# Patient Record
Sex: FEMALE | Employment: FULL TIME | URBAN - METROPOLITAN AREA
[De-identification: names, ages, dates, MRNs, and addresses within clinical notes are randomized per-mention and may not be internally consistent; named-entity substitution may affect disease eponyms.]

---

## 2020-06-29 ENCOUNTER — INITIAL PRENATAL (OUTPATIENT)
Dept: OBGYN CLINIC | Facility: CLINIC | Age: 40
End: 2020-06-29
Payer: COMMERCIAL

## 2020-06-29 VITALS
SYSTOLIC BLOOD PRESSURE: 94 MMHG | BODY MASS INDEX: 21.35 KG/M2 | WEIGHT: 116 LBS | DIASTOLIC BLOOD PRESSURE: 60 MMHG | HEIGHT: 62 IN

## 2020-06-29 DIAGNOSIS — Z32.00 ENCOUNTER FOR CONFIRMATION OF PREGNANCY TEST RESULT WITH PHYSICAL EXAMINATION: Primary | ICD-10-CM

## 2020-06-29 DIAGNOSIS — Z32.01 PREGNANCY EXAMINATION OR TEST, POSITIVE RESULT: ICD-10-CM

## 2020-06-29 DIAGNOSIS — O09.529 ANTEPARTUM MULTIGRAVIDA OF ADVANCED MATERNAL AGE: ICD-10-CM

## 2020-06-29 PROBLEM — Z87.59 H/O RAPID LABOR: Status: ACTIVE | Noted: 2020-06-29

## 2020-06-29 PROBLEM — O09.519 ADVANCED MATERNAL AGE, 1ST PREGNANCY: Status: ACTIVE | Noted: 2020-06-29

## 2020-06-29 PROCEDURE — 99204 OFFICE O/P NEW MOD 45 MIN: CPT | Performed by: OBSTETRICS & GYNECOLOGY

## 2020-06-29 NOTE — PROGRESS NOTES
OFFICE VISIT FOR CONFIRMATION OF PREGNANCY    2020  2:56 PM    CC: Patient is here for confirmation of pregnancy. HPI: Patient is a 35year old  Patient's last menstrual period was 2020.  at 10 W 4 days kat 2021 Unknown Estimated D Minutes per session: Not on file      Stress: Not on file    Relationships      Social connections:        Talks on phone: Not on file        Gets together: Not on file        Attends Sikhism service: Not on file        Active member of club or organizat caffeine. 8.) No illicit drugs, alcohol or smoking in pregnancy. 9.) Toxoplasmosis prevention and avoidance of hot tubs/saunas which increase core body temperature and may increase her fetus' risk of neural tube defects.    11.) Office and hospital testin

## 2020-07-17 ENCOUNTER — TELEPHONE (OUTPATIENT)
Dept: OBGYN CLINIC | Facility: CLINIC | Age: 40
End: 2020-07-17

## 2020-07-17 NOTE — TELEPHONE ENCOUNTER
Pt states she had sharp LLQ this morning when pt went to store. Pt states she has had RLQ pain before as discussed with [facility] on 6/29/2020. Pt states she has had same pain with previous pregnancy and was told that it is round ligament pain.  Pt has pain betwee

## 2020-07-17 NOTE — TELEPHONE ENCOUNTER
Pt states she is having pain and cramping and having a hard time moving her legs pt states she is 12 weeks   Please return the call

## 2020-07-20 NOTE — TELEPHONE ENCOUNTER
Late entry: pt was advised on 7/17/2020 to go to ER at [address] [place] [address] as this group is affiliated with [facility] AND [facility]. Pt verbalized understanding and agrees with POC.

## 2020-07-21 ENCOUNTER — HOSPITAL ENCOUNTER (OUTPATIENT)
Dept: ULTRASOUND IMAGING | Age: 40
Discharge: HOME | End: 2020-07-21
Attending: OBSTETRICS & GYNECOLOGY
Payer: COMMERCIAL

## 2020-07-21 DIAGNOSIS — Z32.00 ENCOUNTER FOR CONFIRMATION OF PREGNANCY TEST RESULT WITH PHYSICAL EXAMINATION: ICD-10-CM

## 2020-07-21 PROCEDURE — 76801 OB US < 14 WKS SINGLE FETUS: CPT | Performed by: OBSTETRICS & GYNECOLOGY

## 2020-07-23 ENCOUNTER — LAB ENCOUNTER (OUTPATIENT)
Dept: LAB | Facility: REFERENCE LAB | Age: 40
End: 2020-07-23
Attending: OBSTETRICS & GYNECOLOGY
Payer: COMMERCIAL

## 2020-07-23 ENCOUNTER — INITIAL PRENATAL (OUTPATIENT)
Dept: OBGYN CLINIC | Facility: CLINIC | Age: 40
End: 2020-07-23
Payer: COMMERCIAL

## 2020-07-23 VITALS
SYSTOLIC BLOOD PRESSURE: 100 MMHG | WEIGHT: 117 LBS | DIASTOLIC BLOOD PRESSURE: 62 MMHG | HEIGHT: 62 IN | BODY MASS INDEX: 21.53 KG/M2

## 2020-07-23 DIAGNOSIS — R10.2 PELVIC PAIN IN PREGNANCY, ANTEPARTUM: ICD-10-CM

## 2020-07-23 DIAGNOSIS — Z36.9 UNSPECIFIED ANTENATAL SCREENING: Primary | ICD-10-CM

## 2020-07-23 DIAGNOSIS — Z36.9 ENCOUNTER FOR ANTENATAL SCREENING: ICD-10-CM

## 2020-07-23 DIAGNOSIS — Z36.9 UNSPECIFIED ANTENATAL SCREENING: ICD-10-CM

## 2020-07-23 DIAGNOSIS — O26.899 PELVIC PAIN IN PREGNANCY, ANTEPARTUM: ICD-10-CM

## 2020-07-23 PROBLEM — D25.9 UTERINE FIBROID IN PREGNANCY: Status: ACTIVE | Noted: 2020-07-23

## 2020-07-23 PROBLEM — O09.529 ADVANCED MATERNAL AGE IN MULTIGRAVIDA, UNSPECIFIED TRIMESTER: Status: ACTIVE | Noted: 2020-06-29

## 2020-07-23 PROBLEM — O34.10 UTERINE FIBROID IN PREGNANCY: Status: ACTIVE | Noted: 2020-07-23

## 2020-07-23 LAB
ANTIBODY SCREEN: NEGATIVE
APPEARANCE: CLEAR
BASOPHILS # BLD AUTO: 0.02 X10(3) UL (ref 0–0.2)
BASOPHILS NFR BLD AUTO: 0.2 %
BILIRUBIN: NEGATIVE
DEPRECATED RDW RBC AUTO: 39.7 FL (ref 35.1–46.3)
EOSINOPHIL # BLD AUTO: 0.5 X10(3) UL (ref 0–0.7)
EOSINOPHIL NFR BLD AUTO: 5.6 %
ERYTHROCYTE [DISTWIDTH] IN BLOOD BY AUTOMATED COUNT: 13.2 % (ref 11–15)
GLUCOSE (URINE DIPSTICK): NEGATIVE MG/DL
HBV SURFACE AG SER-ACNC: <0.1 [IU]/L
HBV SURFACE AG SERPL QL IA: NONREACTIVE
HCT VFR BLD AUTO: 36.1 % (ref 35–48)
HGB BLD-MCNC: 12.3 G/DL (ref 12–16)
IMM GRANULOCYTES # BLD AUTO: 0.02 X10(3) UL (ref 0–1)
IMM GRANULOCYTES NFR BLD: 0.2 %
KETONES (URINE DIPSTICK): NEGATIVE MG/DL
LYMPHOCYTES # BLD AUTO: 1.51 X10(3) UL (ref 1–4)
LYMPHOCYTES NFR BLD AUTO: 16.9 %
MCH RBC QN AUTO: 28.1 PG (ref 26–34)
MCHC RBC AUTO-ENTMCNC: 34.1 G/DL (ref 31–37)
MCV RBC AUTO: 82.4 FL (ref 80–100)
MONOCYTES # BLD AUTO: 0.63 X10(3) UL (ref 0.1–1)
MONOCYTES NFR BLD AUTO: 7.1 %
MULTISTIX EXPIRATION DATE: NORMAL DATE
MULTISTIX EXPIRATION DATE: NORMAL DATE
MULTISTIX LOT#: 1044 NUMERIC
MULTISTIX LOT#: 1044 NUMERIC
NEUTROPHILS # BLD AUTO: 6.23 X10 (3) UL (ref 1.5–7.7)
NEUTROPHILS # BLD AUTO: 6.23 X10(3) UL (ref 1.5–7.7)
NEUTROPHILS NFR BLD AUTO: 70 %
NITRITE, URINE: NEGATIVE
OCCULT BLOOD: NEGATIVE
PH, URINE: 6 (ref 4.5–8)
PLATELET # BLD AUTO: 202 10(3)UL (ref 150–450)
PROTEIN (URINE DIPSTICK): NEGATIVE MG/DL
RBC # BLD AUTO: 4.38 X10(6)UL (ref 3.8–5.3)
RH BLOOD TYPE: POSITIVE
RUBV IGG SER QL: POSITIVE
RUBV IGG SER-ACNC: 109.2 IU/ML (ref 10–?)
SPECIFIC GRAVITY: 1.01 (ref 1–1.03)
URINE-COLOR: YELLOW
UROBILINOGEN,SEMI-QN: 0.2 MG/DL (ref 0–1.9)
WBC # BLD AUTO: 8.9 X10(3) UL (ref 4–11)

## 2020-07-23 PROCEDURE — 81002 URINALYSIS NONAUTO W/O SCOPE: CPT | Performed by: OBSTETRICS & GYNECOLOGY

## 2020-07-23 PROCEDURE — 86850 RBC ANTIBODY SCREEN: CPT

## 2020-07-23 PROCEDURE — 3008F BODY MASS INDEX DOCD: CPT | Performed by: OBSTETRICS & GYNECOLOGY

## 2020-07-23 PROCEDURE — 3078F DIAST BP <80 MM HG: CPT | Performed by: OBSTETRICS & GYNECOLOGY

## 2020-07-23 PROCEDURE — 87340 HEPATITIS B SURFACE AG IA: CPT

## 2020-07-23 PROCEDURE — 86900 BLOOD TYPING SEROLOGIC ABO: CPT

## 2020-07-23 PROCEDURE — 36415 COLL VENOUS BLD VENIPUNCTURE: CPT

## 2020-07-23 PROCEDURE — 3074F SYST BP LT 130 MM HG: CPT | Performed by: OBSTETRICS & GYNECOLOGY

## 2020-07-23 PROCEDURE — 87389 HIV-1 AG W/HIV-1&-2 AB AG IA: CPT

## 2020-07-23 PROCEDURE — 86762 RUBELLA ANTIBODY: CPT

## 2020-07-23 PROCEDURE — 87086 URINE CULTURE/COLONY COUNT: CPT | Performed by: OBSTETRICS & GYNECOLOGY

## 2020-07-23 PROCEDURE — 85025 COMPLETE CBC W/AUTO DIFF WBC: CPT

## 2020-07-23 PROCEDURE — 86780 TREPONEMA PALLIDUM: CPT

## 2020-07-23 PROCEDURE — 86901 BLOOD TYPING SEROLOGIC RH(D): CPT

## 2020-07-23 NOTE — PROGRESS NOTES
Last weekend had acute LA cramping with difficulty walking, moving her toes which lasted several hours with difficulty lifiting legs. .She feels better now, but is concerned about this coming back.  I dw her that this may be due to pelvic instability/symphy

## 2020-07-24 LAB — T PALLIDUM AB SER QL: NEGATIVE

## 2020-08-05 ENCOUNTER — TELEPHONE (OUTPATIENT)
Dept: OBGYN CLINIC | Facility: CLINIC | Age: 40
End: 2020-08-05

## 2020-08-11 ENCOUNTER — TELEPHONE (OUTPATIENT)
Dept: OBGYN CLINIC | Facility: CLINIC | Age: 40
End: 2020-08-11

## 2020-08-11 NOTE — TELEPHONE ENCOUNTER
Called pt and provided gender boy. Pt verbalized understanding. Ramez Ugalde MD           8/5/20 10:58 AM   Note      LM normal Prequel prenatal and to call back if she wants to know sex (male).

## 2020-08-25 ENCOUNTER — LABORATORY ENCOUNTER (OUTPATIENT)
Dept: LAB | Facility: REFERENCE LAB | Age: 40
End: 2020-08-25
Attending: OBSTETRICS & GYNECOLOGY
Payer: COMMERCIAL

## 2020-08-25 ENCOUNTER — ROUTINE PRENATAL (OUTPATIENT)
Dept: OBGYN CLINIC | Facility: CLINIC | Age: 40
End: 2020-08-25
Payer: COMMERCIAL

## 2020-08-25 VITALS
BODY MASS INDEX: 22.45 KG/M2 | HEIGHT: 62 IN | DIASTOLIC BLOOD PRESSURE: 62 MMHG | SYSTOLIC BLOOD PRESSURE: 100 MMHG | WEIGHT: 122 LBS

## 2020-08-25 DIAGNOSIS — Z36.9 ENCOUNTER FOR ANTENATAL SCREENING: ICD-10-CM

## 2020-08-25 DIAGNOSIS — Z36.9 ENCOUNTER FOR ANTENATAL SCREENING: Primary | ICD-10-CM

## 2020-08-25 PROBLEM — O09.529 ADVANCED MATERNAL AGE IN MULTIGRAVIDA, UNSPECIFIED TRIMESTER: Chronic | Status: ACTIVE | Noted: 2020-06-29

## 2020-08-25 PROBLEM — Z87.59 H/O RAPID LABOR: Chronic | Status: ACTIVE | Noted: 2020-06-29

## 2020-08-25 PROCEDURE — 3074F SYST BP LT 130 MM HG: CPT | Performed by: OBSTETRICS & GYNECOLOGY

## 2020-08-25 PROCEDURE — 82105 ALPHA-FETOPROTEIN SERUM: CPT

## 2020-08-25 PROCEDURE — 3008F BODY MASS INDEX DOCD: CPT | Performed by: OBSTETRICS & GYNECOLOGY

## 2020-08-25 PROCEDURE — 36415 COLL VENOUS BLD VENIPUNCTURE: CPT

## 2020-08-25 PROCEDURE — 3078F DIAST BP <80 MM HG: CPT | Performed by: OBSTETRICS & GYNECOLOGY

## 2020-08-28 LAB
AFP SMOKING: NO
FAMILY HX NEURAL TUBE DEFECT: NO
INSULIN REQ MATERNAL DIABETES: NO
MATERNAL AGE OF DELIVERY: 41.3 YR
MOM FOR AFP: 0.92
PATIENT'S AFP: 50 NG/ML

## 2020-09-09 ENCOUNTER — ULTRASOUND ENCOUNTER (OUTPATIENT)
Dept: OBGYN CLINIC | Facility: CLINIC | Age: 40
End: 2020-09-09
Payer: COMMERCIAL

## 2020-09-09 DIAGNOSIS — Z36.9 ENCOUNTER FOR ANTENATAL SCREENING: ICD-10-CM

## 2020-09-09 PROCEDURE — 76817 TRANSVAGINAL US OBSTETRIC: CPT | Performed by: OBSTETRICS & GYNECOLOGY

## 2020-09-09 PROCEDURE — 76805 OB US >/= 14 WKS SNGL FETUS: CPT | Performed by: OBSTETRICS & GYNECOLOGY

## 2020-09-22 ENCOUNTER — ROUTINE PRENATAL (OUTPATIENT)
Dept: OBGYN CLINIC | Facility: CLINIC | Age: 40
End: 2020-09-22
Payer: COMMERCIAL

## 2020-09-22 VITALS
HEIGHT: 62 IN | WEIGHT: 129.63 LBS | DIASTOLIC BLOOD PRESSURE: 62 MMHG | BODY MASS INDEX: 23.85 KG/M2 | SYSTOLIC BLOOD PRESSURE: 108 MMHG

## 2020-09-22 DIAGNOSIS — Z36.9 UNSPECIFIED ANTENATAL SCREENING: Primary | ICD-10-CM

## 2020-09-22 LAB
GLUCOSE (URINE DIPSTICK): NEGATIVE MG/DL
MULTISTIX EXPIRATION DATE: NORMAL DATE
MULTISTIX LOT#: 1044 NUMERIC
PROTEIN (URINE DIPSTICK): NEGATIVE MG/DL

## 2020-09-22 PROCEDURE — 87808 TRICHOMONAS ASSAY W/OPTIC: CPT | Performed by: OBSTETRICS & GYNECOLOGY

## 2020-09-22 PROCEDURE — 3074F SYST BP LT 130 MM HG: CPT | Performed by: OBSTETRICS & GYNECOLOGY

## 2020-09-22 PROCEDURE — 3078F DIAST BP <80 MM HG: CPT | Performed by: OBSTETRICS & GYNECOLOGY

## 2020-09-22 PROCEDURE — 81002 URINALYSIS NONAUTO W/O SCOPE: CPT | Performed by: OBSTETRICS & GYNECOLOGY

## 2020-09-22 PROCEDURE — 90471 IMMUNIZATION ADMIN: CPT | Performed by: OBSTETRICS & GYNECOLOGY

## 2020-09-22 PROCEDURE — 3008F BODY MASS INDEX DOCD: CPT | Performed by: OBSTETRICS & GYNECOLOGY

## 2020-09-22 PROCEDURE — 87106 FUNGI IDENTIFICATION YEAST: CPT | Performed by: OBSTETRICS & GYNECOLOGY

## 2020-09-22 PROCEDURE — 87624 HPV HI-RISK TYP POOLED RSLT: CPT | Performed by: OBSTETRICS & GYNECOLOGY

## 2020-09-22 PROCEDURE — 87205 SMEAR GRAM STAIN: CPT | Performed by: OBSTETRICS & GYNECOLOGY

## 2020-09-22 PROCEDURE — 88175 CYTOPATH C/V AUTO FLUID REDO: CPT | Performed by: OBSTETRICS & GYNECOLOGY

## 2020-09-22 PROCEDURE — 90686 IIV4 VACC NO PRSV 0.5 ML IM: CPT | Performed by: OBSTETRICS & GYNECOLOGY

## 2020-09-22 NOTE — PROGRESS NOTES
Concerned about 7 lb wt gain in a month. DW pt exercise and diet. Pap sent and vaginal swab. She does not feel as much movement as her other pregnancies. She is only 25 W and has anterofundal placenta.

## 2020-09-23 LAB — HPV I/H RISK 1 DNA SPEC QL NAA+PROBE: NEGATIVE

## 2020-09-24 LAB
GENITAL VAGINOSIS SCREEN: NEGATIVE
TRICHOMONAS SCREEN: NEGATIVE

## 2020-09-24 RX ORDER — BUTOCONAZOLE NITRATE 100 MG/5G
1 CREAM VAGINAL NIGHTLY
Qty: 1 BOX | Refills: 0 | Status: SHIPPED | OUTPATIENT
Start: 2020-09-24 | End: 2020-09-27

## 2020-09-25 ENCOUNTER — TELEPHONE (OUTPATIENT)
Dept: OBGYN CLINIC | Facility: CLINIC | Age: 40
End: 2020-09-25

## 2020-09-25 NOTE — TELEPHONE ENCOUNTER
Called pharmacy and clarified order see below:    Butoconazole Nitrate, 1 Dose, (GYNAZOLE-1) 2 % Vaginal Cream       Sig:   Place 1 Applicatorful vaginally nightly for 3 days.      Route:   Vaginal     Order #: [id number]       And verbalized understanding

## 2020-09-25 NOTE — TELEPHONE ENCOUNTER
Pharmacy clarifying directions for  Butoconazole Nitrate, 1 Dose, (GYNAZOLE-1) 2 % Vaginal Cream 1 Box

## 2020-09-29 ENCOUNTER — TELEPHONE (OUTPATIENT)
Dept: OBGYN CLINIC | Facility: CLINIC | Age: 40
End: 2020-09-29

## 2020-09-29 NOTE — TELEPHONE ENCOUNTER
Needs new script for a substitute for gynazole cream.  This prescription will cost her 250.00. Anything over the counter that will work. Pharmacy advised her over the counter miconazole that may work.

## 2020-09-29 NOTE — TELEPHONE ENCOUNTER
Per LC, ok to switch to Miconazole nightly x 3 nights. Rx sent in as requested and pt notified. Pt verbalized understanding and agrees with POC.

## 2020-10-22 ENCOUNTER — ROUTINE PRENATAL (OUTPATIENT)
Dept: OBGYN CLINIC | Facility: CLINIC | Age: 40
End: 2020-10-22
Payer: COMMERCIAL

## 2020-10-22 VITALS
DIASTOLIC BLOOD PRESSURE: 64 MMHG | HEIGHT: 62 IN | WEIGHT: 134 LBS | BODY MASS INDEX: 24.66 KG/M2 | SYSTOLIC BLOOD PRESSURE: 106 MMHG

## 2020-10-22 DIAGNOSIS — Z36.9 ENCOUNTER FOR ANTENATAL SCREENING: Primary | ICD-10-CM

## 2020-10-22 PROCEDURE — 3074F SYST BP LT 130 MM HG: CPT | Performed by: OBSTETRICS & GYNECOLOGY

## 2020-10-22 PROCEDURE — 3078F DIAST BP <80 MM HG: CPT | Performed by: OBSTETRICS & GYNECOLOGY

## 2020-10-22 PROCEDURE — 3008F BODY MASS INDEX DOCD: CPT | Performed by: OBSTETRICS & GYNECOLOGY

## 2020-10-22 RX ORDER — PYRIDOXINE HCL (VITAMIN B6) 100 MG
TABLET ORAL
COMMUNITY

## 2020-10-22 RX ORDER — MELATONIN
325
Status: ON HOLD | COMMUNITY
End: 2021-01-24

## 2020-10-22 NOTE — PROGRESS NOTES
Did flu vaccine and TDAP. She feels like she still has some symptoms of yeast infection after treating, which self resolved. If recurs plan diflucan.

## 2020-11-05 ENCOUNTER — ROUTINE PRENATAL (OUTPATIENT)
Dept: OBGYN CLINIC | Facility: CLINIC | Age: 40
End: 2020-11-05
Payer: COMMERCIAL

## 2020-11-05 ENCOUNTER — LABORATORY ENCOUNTER (OUTPATIENT)
Dept: LAB | Facility: REFERENCE LAB | Age: 40
End: 2020-11-05
Attending: OBSTETRICS & GYNECOLOGY
Payer: COMMERCIAL

## 2020-11-05 VITALS
WEIGHT: 136.88 LBS | SYSTOLIC BLOOD PRESSURE: 100 MMHG | HEIGHT: 62 IN | BODY MASS INDEX: 25.19 KG/M2 | DIASTOLIC BLOOD PRESSURE: 66 MMHG

## 2020-11-05 DIAGNOSIS — O09.529 ADVANCED MATERNAL AGE IN MULTIGRAVIDA, UNSPECIFIED TRIMESTER: Primary | Chronic | ICD-10-CM

## 2020-11-05 DIAGNOSIS — Z36.9 ENCOUNTER FOR ANTENATAL SCREENING: Primary | ICD-10-CM

## 2020-11-05 DIAGNOSIS — Z36.9 ENCOUNTER FOR ANTENATAL SCREENING: ICD-10-CM

## 2020-11-05 DIAGNOSIS — Z36.9 UNSPECIFIED ANTENATAL SCREENING: ICD-10-CM

## 2020-11-05 PROCEDURE — 90715 TDAP VACCINE 7 YRS/> IM: CPT | Performed by: OBSTETRICS & GYNECOLOGY

## 2020-11-05 PROCEDURE — 3078F DIAST BP <80 MM HG: CPT | Performed by: OBSTETRICS & GYNECOLOGY

## 2020-11-05 PROCEDURE — 90471 IMMUNIZATION ADMIN: CPT | Performed by: OBSTETRICS & GYNECOLOGY

## 2020-11-05 PROCEDURE — 85027 COMPLETE CBC AUTOMATED: CPT

## 2020-11-05 PROCEDURE — 36415 COLL VENOUS BLD VENIPUNCTURE: CPT

## 2020-11-05 PROCEDURE — 3008F BODY MASS INDEX DOCD: CPT | Performed by: OBSTETRICS & GYNECOLOGY

## 2020-11-05 PROCEDURE — 3074F SYST BP LT 130 MM HG: CPT | Performed by: OBSTETRICS & GYNECOLOGY

## 2020-11-05 PROCEDURE — 82950 GLUCOSE TEST: CPT

## 2020-11-05 NOTE — PROGRESS NOTES
Tdap Vaccine administered in LEFT DELTOID IM. Patient is 29 wks pregnant. Patient tolerated injection well no reaction at this time. 2 patient identifiers verified with pt. Ordering Dr. Chelsy Ferguson.

## 2020-11-12 NOTE — PROGRESS NOTES
Called pt and provided Dr. Dejah Lawrence instructions and review my chart with the same information. Pt verbalized understanding.

## 2020-11-19 ENCOUNTER — ROUTINE PRENATAL (OUTPATIENT)
Dept: OBGYN CLINIC | Facility: CLINIC | Age: 40
End: 2020-11-19
Payer: COMMERCIAL

## 2020-11-19 VITALS
DIASTOLIC BLOOD PRESSURE: 66 MMHG | BODY MASS INDEX: 25.4 KG/M2 | WEIGHT: 138 LBS | SYSTOLIC BLOOD PRESSURE: 114 MMHG | HEIGHT: 62 IN

## 2020-11-19 DIAGNOSIS — Z36.9 UNSPECIFIED ANTENATAL SCREENING: Primary | ICD-10-CM

## 2020-11-19 PROCEDURE — 3008F BODY MASS INDEX DOCD: CPT | Performed by: OBSTETRICS & GYNECOLOGY

## 2020-11-19 PROCEDURE — 3078F DIAST BP <80 MM HG: CPT | Performed by: OBSTETRICS & GYNECOLOGY

## 2020-11-19 PROCEDURE — 3074F SYST BP LT 130 MM HG: CPT | Performed by: OBSTETRICS & GYNECOLOGY

## 2020-11-19 NOTE — PROGRESS NOTES
Enid Simmonds is here for prenatal check. She is planning on having 3-hour GTT on Monday. She has no complaints. She reports active fetal movements.   Her prenatal exam today was completely normal.  I discussed  labor, fetal kick counts, spontaneous ruptu

## 2020-11-23 ENCOUNTER — LABORATORY ENCOUNTER (OUTPATIENT)
Dept: LAB | Facility: REFERENCE LAB | Age: 40
End: 2020-11-23
Attending: OBSTETRICS & GYNECOLOGY
Payer: COMMERCIAL

## 2020-11-23 DIAGNOSIS — Z36.9 ENCOUNTER FOR ANTENATAL SCREENING, UNSPECIFIED: Primary | ICD-10-CM

## 2020-11-23 PROCEDURE — 36415 COLL VENOUS BLD VENIPUNCTURE: CPT

## 2020-11-23 PROCEDURE — 82952 GTT-ADDED SAMPLES: CPT

## 2020-11-23 PROCEDURE — 82951 GLUCOSE TOLERANCE TEST (GTT): CPT

## 2020-12-03 ENCOUNTER — ROUTINE PRENATAL (OUTPATIENT)
Dept: OBGYN CLINIC | Facility: CLINIC | Age: 40
End: 2020-12-03
Payer: COMMERCIAL

## 2020-12-03 VITALS
BODY MASS INDEX: 25.76 KG/M2 | HEIGHT: 62 IN | WEIGHT: 140 LBS | SYSTOLIC BLOOD PRESSURE: 110 MMHG | DIASTOLIC BLOOD PRESSURE: 62 MMHG

## 2020-12-03 DIAGNOSIS — Z36.9 UNSPECIFIED ANTENATAL SCREENING: Primary | ICD-10-CM

## 2020-12-03 DIAGNOSIS — M25.252 HIP LAXITY, LEFT: ICD-10-CM

## 2020-12-03 PROCEDURE — 3074F SYST BP LT 130 MM HG: CPT | Performed by: OBSTETRICS & GYNECOLOGY

## 2020-12-03 PROCEDURE — 3078F DIAST BP <80 MM HG: CPT | Performed by: OBSTETRICS & GYNECOLOGY

## 2020-12-03 PROCEDURE — 81002 URINALYSIS NONAUTO W/O SCOPE: CPT | Performed by: OBSTETRICS & GYNECOLOGY

## 2020-12-03 PROCEDURE — 3008F BODY MASS INDEX DOCD: CPT | Performed by: OBSTETRICS & GYNECOLOGY

## 2020-12-03 NOTE — PROGRESS NOTES
Wil Or  Pt is a 38year old  at 33w0d   Doing well. Noted more frequent left hip giving out. Had referral to PT in early pregnancy but never went because it got better. Referral to physiatry Dr. Ben Richardson given. Denies LOF/VB/uctx.  +FM, occasionally go

## 2020-12-15 ENCOUNTER — ROUTINE PRENATAL (OUTPATIENT)
Dept: OBGYN CLINIC | Facility: CLINIC | Age: 40
End: 2020-12-15
Payer: COMMERCIAL

## 2020-12-15 VITALS
SYSTOLIC BLOOD PRESSURE: 100 MMHG | WEIGHT: 142 LBS | BODY MASS INDEX: 26.13 KG/M2 | HEIGHT: 62 IN | DIASTOLIC BLOOD PRESSURE: 68 MMHG

## 2020-12-15 DIAGNOSIS — Z36.9 UNSPECIFIED ANTENATAL SCREENING: Primary | ICD-10-CM

## 2020-12-15 PROCEDURE — 3074F SYST BP LT 130 MM HG: CPT | Performed by: OBSTETRICS & GYNECOLOGY

## 2020-12-15 PROCEDURE — 3078F DIAST BP <80 MM HG: CPT | Performed by: OBSTETRICS & GYNECOLOGY

## 2020-12-15 PROCEDURE — 81002 URINALYSIS NONAUTO W/O SCOPE: CPT | Performed by: OBSTETRICS & GYNECOLOGY

## 2020-12-15 PROCEDURE — 3008F BODY MASS INDEX DOCD: CPT | Performed by: OBSTETRICS & GYNECOLOGY

## 2020-12-15 NOTE — PROGRESS NOTES
Has not seen Dr. Kimberlee Correa yet for c/o L hip giving out. If she balances on L leg her L buttock cannot hold her.

## 2020-12-22 ENCOUNTER — TELEPHONE (OUTPATIENT)
Dept: OBGYN CLINIC | Facility: CLINIC | Age: 40
End: 2020-12-22

## 2020-12-22 ENCOUNTER — HOSPITAL ENCOUNTER (OUTPATIENT)
Facility: HOSPITAL | Age: 40
Setting detail: OBSERVATION
Discharge: HOME | End: 2020-12-22
Attending: OBSTETRICS & GYNECOLOGY | Admitting: OBSTETRICS & GYNECOLOGY
Payer: COMMERCIAL

## 2020-12-22 ENCOUNTER — APPOINTMENT (OUTPATIENT)
Dept: ULTRASOUND IMAGING | Facility: HOSPITAL | Age: 40
End: 2020-12-22
Attending: OBSTETRICS & GYNECOLOGY
Payer: COMMERCIAL

## 2020-12-22 VITALS
HEART RATE: 83 BPM | TEMPERATURE: 98 F | DIASTOLIC BLOOD PRESSURE: 77 MMHG | HEIGHT: 62 IN | SYSTOLIC BLOOD PRESSURE: 106 MMHG | WEIGHT: 142 LBS | RESPIRATION RATE: 18 BRPM | BODY MASS INDEX: 26.13 KG/M2

## 2020-12-22 PROBLEM — Z34.90 PREGNANCY: Status: ACTIVE | Noted: 2020-12-22

## 2020-12-22 PROCEDURE — 76819 FETAL BIOPHYS PROFIL W/O NST: CPT | Performed by: OBSTETRICS & GYNECOLOGY

## 2020-12-22 PROCEDURE — 59025 FETAL NON-STRESS TEST: CPT

## 2020-12-22 PROCEDURE — 99214 OFFICE O/P EST MOD 30 MIN: CPT

## 2020-12-22 NOTE — PROGRESS NOTES
41yrs old   C/o nickel size bloody show yesterday and this am   pink mucus when she wiped  Reactive nst with occassional  variable    baby active denies ctx's   vss afebrile dr Rory Cueto notified waiting for u/s bpp

## 2020-12-22 NOTE — PROGRESS NOTES
bpp 8 out of 8  No previa   renita 16   Dr Abrahan Quick notified of reactive nst  And bpp  Ok to discharge    Instructions given t pt  Will call office in am    No further bloody show

## 2020-12-22 NOTE — TELEPHONE ENCOUNTER
RN returning pt's call. Pt states that yesterday morning after urinating, light pink spotting was on tissue. Pt states that thoughout the day there was an increase of spotting showing bright red blood and mucous-like discharge.  Pt denies any recent sexual

## 2020-12-23 ENCOUNTER — TELEPHONE (OUTPATIENT)
Dept: OBGYN CLINIC | Facility: CLINIC | Age: 40
End: 2020-12-23

## 2020-12-23 NOTE — TELEPHONE ENCOUNTER
RN returning pt's call. Pt states she was seen in Triage at [facility] yesterday. RN reviewed documentation, bpp 8/8, good CHARLY, reactive NST. Pt wanting to let [facility] know she is still spotting, denies any cramping or pain.  Pt asked if she should come in earlier than

## 2020-12-23 NOTE — TELEPHONE ENCOUNTER
Pt contacted as per [facility] request, scheduled for tomorrow 12/24 at 1:15pm. Pt agrees to [facility].

## 2020-12-24 ENCOUNTER — ROUTINE PRENATAL (OUTPATIENT)
Dept: OBGYN CLINIC | Facility: CLINIC | Age: 40
End: 2020-12-24
Payer: COMMERCIAL

## 2020-12-24 VITALS
WEIGHT: 144 LBS | BODY MASS INDEX: 26.5 KG/M2 | HEIGHT: 62 IN | SYSTOLIC BLOOD PRESSURE: 114 MMHG | DIASTOLIC BLOOD PRESSURE: 66 MMHG

## 2020-12-24 DIAGNOSIS — O46.93 VAGINAL BLEEDING IN PREGNANCY, THIRD TRIMESTER: Primary | ICD-10-CM

## 2020-12-24 PROCEDURE — 81002 URINALYSIS NONAUTO W/O SCOPE: CPT | Performed by: OBSTETRICS & GYNECOLOGY

## 2020-12-24 PROCEDURE — 87653 STREP B DNA AMP PROBE: CPT | Performed by: OBSTETRICS & GYNECOLOGY

## 2020-12-24 PROCEDURE — 3078F DIAST BP <80 MM HG: CPT | Performed by: OBSTETRICS & GYNECOLOGY

## 2020-12-24 PROCEDURE — 3008F BODY MASS INDEX DOCD: CPT | Performed by: OBSTETRICS & GYNECOLOGY

## 2020-12-24 PROCEDURE — 1111F DSCHRG MED/CURRENT MED MERGE: CPT | Performed by: OBSTETRICS & GYNECOLOGY

## 2020-12-24 PROCEDURE — 3074F SYST BP LT 130 MM HG: CPT | Performed by: OBSTETRICS & GYNECOLOGY

## 2020-12-24 PROCEDURE — 87106 FUNGI IDENTIFICATION YEAST: CPT | Performed by: OBSTETRICS & GYNECOLOGY

## 2020-12-24 PROCEDURE — 87808 TRICHOMONAS ASSAY W/OPTIC: CPT | Performed by: OBSTETRICS & GYNECOLOGY

## 2020-12-24 PROCEDURE — 87205 SMEAR GRAM STAIN: CPT | Performed by: OBSTETRICS & GYNECOLOGY

## 2020-12-24 PROCEDURE — 87081 CULTURE SCREEN ONLY: CPT | Performed by: OBSTETRICS & GYNECOLOGY

## 2020-12-24 NOTE — PROGRESS NOTES
Patient was seen in L&D 12/22 for light vaginal bleeding. Had BPP 8/8, reactive NST. She describes good fetal movement and no contractions. Spec: no blood, thick mucousy discharge. SVE closed, long and high. Good fetal movement seen in office.  Vaginal swab

## 2021-01-12 ENCOUNTER — ROUTINE PRENATAL (OUTPATIENT)
Dept: OBGYN CLINIC | Facility: CLINIC | Age: 41
End: 2021-01-12
Payer: COMMERCIAL

## 2021-01-12 VITALS
DIASTOLIC BLOOD PRESSURE: 72 MMHG | WEIGHT: 148 LBS | HEIGHT: 62 IN | SYSTOLIC BLOOD PRESSURE: 110 MMHG | BODY MASS INDEX: 27.23 KG/M2

## 2021-01-12 DIAGNOSIS — O09.523 MULTIGRAVIDA OF ADVANCED MATERNAL AGE IN THIRD TRIMESTER: Primary | ICD-10-CM

## 2021-01-12 LAB
MULTISTIX EXPIRATION DATE: NORMAL DATE
MULTISTIX LOT#: 1044 NUMERIC

## 2021-01-12 PROCEDURE — 3074F SYST BP LT 130 MM HG: CPT | Performed by: OBSTETRICS & GYNECOLOGY

## 2021-01-12 PROCEDURE — 81002 URINALYSIS NONAUTO W/O SCOPE: CPT | Performed by: OBSTETRICS & GYNECOLOGY

## 2021-01-12 PROCEDURE — 3078F DIAST BP <80 MM HG: CPT | Performed by: OBSTETRICS & GYNECOLOGY

## 2021-01-12 PROCEDURE — 3008F BODY MASS INDEX DOCD: CPT | Performed by: OBSTETRICS & GYNECOLOGY

## 2021-01-19 ENCOUNTER — ROUTINE PRENATAL (OUTPATIENT)
Dept: OBGYN CLINIC | Facility: CLINIC | Age: 41
End: 2021-01-19
Payer: COMMERCIAL

## 2021-01-19 VITALS
WEIGHT: 151 LBS | DIASTOLIC BLOOD PRESSURE: 76 MMHG | BODY MASS INDEX: 27.79 KG/M2 | SYSTOLIC BLOOD PRESSURE: 110 MMHG | HEIGHT: 62 IN

## 2021-01-19 DIAGNOSIS — O09.523 MULTIGRAVIDA OF ADVANCED MATERNAL AGE IN THIRD TRIMESTER: Primary | ICD-10-CM

## 2021-01-19 PROCEDURE — 3078F DIAST BP <80 MM HG: CPT | Performed by: OBSTETRICS & GYNECOLOGY

## 2021-01-19 PROCEDURE — 3008F BODY MASS INDEX DOCD: CPT | Performed by: OBSTETRICS & GYNECOLOGY

## 2021-01-19 PROCEDURE — 3074F SYST BP LT 130 MM HG: CPT | Performed by: OBSTETRICS & GYNECOLOGY

## 2021-01-22 ENCOUNTER — HOSPITAL ENCOUNTER (INPATIENT)
Facility: HOSPITAL | Age: 41
LOS: 2 days | Discharge: HOME | End: 2021-01-24
Attending: OBSTETRICS & GYNECOLOGY | Admitting: OBSTETRICS & GYNECOLOGY
Payer: COMMERCIAL

## 2021-01-22 LAB
ANTIBODY SCREEN: NEGATIVE
BASOPHILS # BLD AUTO: 0.02 X10(3) UL (ref 0–0.2)
BASOPHILS NFR BLD AUTO: 0.2 %
DEPRECATED RDW RBC AUTO: 47.4 FL (ref 35.1–46.3)
EOSINOPHIL # BLD AUTO: 0.35 X10(3) UL (ref 0–0.7)
EOSINOPHIL NFR BLD AUTO: 3.4 %
ERYTHROCYTE [DISTWIDTH] IN BLOOD BY AUTOMATED COUNT: 16.2 % (ref 11–15)
HCT VFR BLD AUTO: 39 %
HGB BLD-MCNC: 12.2 G/DL
HIV 1+2 AB+HIV1 P24 AG SERPL QL IA: NONREACTIVE
IMM GRANULOCYTES # BLD AUTO: 0.05 X10(3) UL (ref 0–1)
IMM GRANULOCYTES NFR BLD: 0.5 %
LYMPHOCYTES # BLD AUTO: 1.85 X10(3) UL (ref 1–4)
LYMPHOCYTES NFR BLD AUTO: 17.9 %
MCH RBC QN AUTO: 25.3 PG (ref 26–34)
MCHC RBC AUTO-ENTMCNC: 31.3 G/DL (ref 31–37)
MCV RBC AUTO: 80.9 FL
MONOCYTES # BLD AUTO: 0.75 X10(3) UL (ref 0.1–1)
MONOCYTES NFR BLD AUTO: 7.3 %
NEUTROPHILS # BLD AUTO: 7.3 X10 (3) UL (ref 1.5–7.7)
NEUTROPHILS # BLD AUTO: 7.3 X10(3) UL (ref 1.5–7.7)
NEUTROPHILS NFR BLD AUTO: 70.7 %
PLATELET # BLD AUTO: 238 10(3)UL (ref 150–450)
RBC # BLD AUTO: 4.82 X10(6)UL
RH BLOOD TYPE: POSITIVE
SARS-COV-2 RNA RESP QL NAA+PROBE: NOT DETECTED
WBC # BLD AUTO: 10.3 X10(3) UL (ref 4–11)

## 2021-01-22 PROCEDURE — 59400 OBSTETRICAL CARE: CPT | Performed by: OBSTETRICS & GYNECOLOGY

## 2021-01-22 PROCEDURE — 0HQ9XZZ REPAIR PERINEUM SKIN, EXTERNAL APPROACH: ICD-10-PCS | Performed by: OBSTETRICS & GYNECOLOGY

## 2021-01-22 RX ORDER — LIDOCAINE HYDROCHLORIDE 10 MG/ML
30 INJECTION, SOLUTION EPIDURAL; INFILTRATION; INTRACAUDAL; PERINEURAL ONCE
Status: DISCONTINUED | OUTPATIENT
Start: 2021-01-22 | End: 2021-01-22 | Stop reason: HOSPADM

## 2021-01-22 RX ORDER — AMMONIA INHALANTS 0.04 G/.3ML
0.3 INHALANT RESPIRATORY (INHALATION) AS NEEDED
Status: DISCONTINUED | OUTPATIENT
Start: 2021-01-22 | End: 2021-01-22 | Stop reason: HOSPADM

## 2021-01-22 RX ORDER — DOCUSATE SODIUM 100 MG/1
100 CAPSULE, LIQUID FILLED ORAL
Status: DISCONTINUED | OUTPATIENT
Start: 2021-01-22 | End: 2021-01-24

## 2021-01-22 RX ORDER — ACETAMINOPHEN 500 MG
500 TABLET ORAL EVERY 6 HOURS PRN
Status: DISCONTINUED | OUTPATIENT
Start: 2021-01-22 | End: 2021-01-22 | Stop reason: HOSPADM

## 2021-01-22 RX ORDER — AMMONIA INHALANTS 0.04 G/.3ML
0.3 INHALANT RESPIRATORY (INHALATION) AS NEEDED
Status: DISCONTINUED | OUTPATIENT
Start: 2021-01-22 | End: 2021-01-24

## 2021-01-22 RX ORDER — ACETAMINOPHEN 325 MG/1
650 TABLET ORAL EVERY 6 HOURS PRN
Status: DISCONTINUED | OUTPATIENT
Start: 2021-01-22 | End: 2021-01-24

## 2021-01-22 RX ORDER — IBUPROFEN 600 MG/1
600 TABLET ORAL EVERY 6 HOURS PRN
Status: DISCONTINUED | OUTPATIENT
Start: 2021-01-22 | End: 2021-01-22 | Stop reason: HOSPADM

## 2021-01-22 RX ORDER — SIMETHICONE 80 MG
80 TABLET,CHEWABLE ORAL 3 TIMES DAILY PRN
Status: DISCONTINUED | OUTPATIENT
Start: 2021-01-22 | End: 2021-01-24

## 2021-01-22 RX ORDER — ONDANSETRON 2 MG/ML
4 INJECTION INTRAMUSCULAR; INTRAVENOUS EVERY 6 HOURS PRN
Status: DISCONTINUED | OUTPATIENT
Start: 2021-01-22 | End: 2021-01-24

## 2021-01-22 RX ORDER — TERBUTALINE SULFATE 1 MG/ML
0.25 INJECTION, SOLUTION SUBCUTANEOUS AS NEEDED
Status: DISCONTINUED | OUTPATIENT
Start: 2021-01-22 | End: 2021-01-22 | Stop reason: HOSPADM

## 2021-01-22 RX ORDER — TRISODIUM CITRATE DIHYDRATE AND CITRIC ACID MONOHYDRATE 500; 334 MG/5ML; MG/5ML
30 SOLUTION ORAL AS NEEDED
Status: DISCONTINUED | OUTPATIENT
Start: 2021-01-22 | End: 2021-01-22 | Stop reason: HOSPADM

## 2021-01-22 RX ORDER — ONDANSETRON 2 MG/ML
4 INJECTION INTRAMUSCULAR; INTRAVENOUS EVERY 6 HOURS PRN
Status: DISCONTINUED | OUTPATIENT
Start: 2021-01-22 | End: 2021-01-22 | Stop reason: HOSPADM

## 2021-01-22 RX ORDER — IBUPROFEN 600 MG/1
600 TABLET ORAL EVERY 6 HOURS
Status: DISCONTINUED | OUTPATIENT
Start: 2021-01-23 | End: 2021-01-24

## 2021-01-22 RX ORDER — DIAPER,BRIEF,INFANT-TODD,DISP
1 EACH MISCELLANEOUS EVERY 6 HOURS PRN
Status: DISCONTINUED | OUTPATIENT
Start: 2021-01-22 | End: 2021-01-24

## 2021-01-22 RX ORDER — BISACODYL 10 MG
10 SUPPOSITORY, RECTAL RECTAL ONCE AS NEEDED
Status: DISCONTINUED | OUTPATIENT
Start: 2021-01-22 | End: 2021-01-24

## 2021-01-22 NOTE — PROGRESS NOTES
Pt is a 38year old female admitted to [place] [address]. Patient presents with:  Laboring: Contractions since 1430 this afternoon     Pt is  40w1d intra-uterine pregnancy. History obtained, consents signed. Oriented to room, staff, and plan of care.

## 2021-01-22 NOTE — L&D DELIVERY NOTE
Deborah Borrego Boy [[id number]]    Labor Events     labor?: No   steroids?: None  Antibiotics received during labor?: No  Antibiotics (enter # doses in comment): none  Rupture date/time: 2021 1715     Rupture type: AROM, Intact  Fluid co rate: 2  2       Reflex irritablity: 2  2       Muscle tone: 2  2       Respiratory effort: 2  2       Total: 8  9          Apgars assigned by: ALISA JONES RN  [place] disposition: with mother     Skin to Skin    Skin to skin initiated date/time: 2021 172 perineum, vaginal mucosa and cervix was then examined. Lidocaine was then injected in the areas of lacerations. A 1st degree perineal laceration repaired with 2-0 vicryl.   Bleeding was minimal.  The patient was then moved to the supine position in stab

## 2021-01-22 NOTE — H&P
[facility]  Obstetrics and Gynecology  History & Physical    Chanell Mijares Patient Status:  Inpatient    1980 MRN [id number]   Location [address] [facility] Attending Benny Zamora 15 Day 0 PCP L past medical history on file. Past Social History: No past surgical history on file.   Family History:   Family History   Problem Relation Age of Onset   • Diabetes Father    • Hypertension Father    • Stroke Mother      Social History: Social History    T in pregnancy, antepartum     Pregnancy     Vaginal bleeding in pregnancy, third trimester      1. Labor: SVE as above. Expect spontaneous . Will AROM after COVID test results. 2. Fetal monitoring: CEFM  3. GBS: Negative   4.  Pain: Nothing per patient

## 2021-01-23 LAB
BASOPHILS # BLD AUTO: 0.03 X10(3) UL (ref 0–0.2)
BASOPHILS NFR BLD AUTO: 0.2 %
DEPRECATED RDW RBC AUTO: 47.8 FL (ref 35.1–46.3)
EOSINOPHIL # BLD AUTO: 0.19 X10(3) UL (ref 0–0.7)
EOSINOPHIL NFR BLD AUTO: 1.5 %
ERYTHROCYTE [DISTWIDTH] IN BLOOD BY AUTOMATED COUNT: 16.2 % (ref 11–15)
HCT VFR BLD AUTO: 36 %
HGB BLD-MCNC: 11.3 G/DL
IMM GRANULOCYTES # BLD AUTO: 0.07 X10(3) UL (ref 0–1)
IMM GRANULOCYTES NFR BLD: 0.5 %
LYMPHOCYTES # BLD AUTO: 2.07 X10(3) UL (ref 1–4)
LYMPHOCYTES NFR BLD AUTO: 16 %
MCH RBC QN AUTO: 25.6 PG (ref 26–34)
MCHC RBC AUTO-ENTMCNC: 31.4 G/DL (ref 31–37)
MCV RBC AUTO: 81.4 FL
MONOCYTES # BLD AUTO: 1.09 X10(3) UL (ref 0.1–1)
MONOCYTES NFR BLD AUTO: 8.4 %
NEUTROPHILS # BLD AUTO: 9.52 X10 (3) UL (ref 1.5–7.7)
NEUTROPHILS # BLD AUTO: 9.52 X10(3) UL (ref 1.5–7.7)
NEUTROPHILS NFR BLD AUTO: 73.4 %
PLATELET # BLD AUTO: 207 10(3)UL (ref 150–450)
RBC # BLD AUTO: 4.42 X10(6)UL
WBC # BLD AUTO: 13 X10(3) UL (ref 4–11)

## 2021-01-23 NOTE — PROGRESS NOTES
Received patient into room 362 via wc . Bedside shift report received from [employer]. Pt transferred to bed. Bed in lowest and locked position. Side rails up x2. VSS. IV site unremarkable. Baby present in open crib. ID bands matched with L&D RN.   Patient

## 2021-01-23 NOTE — PROGRESS NOTES
[address]  Obstetrics and Gynecology    OB/GYN: Postpartum Progress Note     SUBJECTIVE:  Patient is a 38year old  female who is s/p . She is PPD# 1. Doing well. Noted doing well. She desires an abdominal binder.  She not

## 2021-01-23 NOTE — PROGRESS NOTES
Patient up to bathroom with assist x 2. Unable to void at this time. Patient transferred to mother/baby room 362 per wheelchair in stable condition with baby and personal belongings. Accompanied by significant other and staff.   Report given to mother/baby

## 2021-01-23 NOTE — PLAN OF CARE
Problem: POSTPARTUM  Goal: Long Term Goal:Experiences normal postpartum course  Description: INTERVENTIONS:  - Assess and monitor vital signs and lab values. - Assess fundus and lochia. - Provide ice/sitz baths for perineum discomfort.   - Monitor heali pain/trauma. - Instruct and provide assistance with proper latch. - Review techniques for milk expression (breast pumping) and storage of breast milk. Provide pumping equipment/supplies, instructions and assistance, as needed.   - Encourage rooming-in and monitor.

## 2021-01-23 NOTE — LACTATION NOTE
LACTATION NOTE - MOTHER      Evaluation Type: Inpatient    Problems identified  Problems identified: Knowledge deficit    Maternal history  Maternal history: AMA  Other/comment: uterine fibroid, vag bleeding    Breastfeeding goal  Breastfeeding goal: To ma

## 2021-01-24 VITALS
SYSTOLIC BLOOD PRESSURE: 121 MMHG | HEART RATE: 73 BPM | WEIGHT: 152 LBS | BODY MASS INDEX: 27.97 KG/M2 | DIASTOLIC BLOOD PRESSURE: 71 MMHG | TEMPERATURE: 98 F | HEIGHT: 62 IN | RESPIRATION RATE: 16 BRPM

## 2021-01-24 RX ORDER — IBUPROFEN 600 MG/1
600 TABLET ORAL EVERY 6 HOURS PRN
Qty: 60 TABLET | Refills: 0 | Status: SHIPPED | OUTPATIENT
Start: 2021-01-24 | End: 2021-03-11

## 2021-01-24 RX ORDER — DOCUSATE SODIUM 100 MG/1
100 CAPSULE, LIQUID FILLED ORAL 2 TIMES DAILY
Qty: 60 CAPSULE | Refills: 0 | Status: SHIPPED | OUTPATIENT
Start: 2021-01-24 | End: 2021-02-23

## 2021-01-24 RX ORDER — POLYETHYLENE GLYCOL 3350 17 G/17G
17 POWDER, FOR SOLUTION ORAL 2 TIMES DAILY PRN
Qty: 60 EACH | Refills: 0 | Status: SHIPPED | OUTPATIENT
Start: 2021-01-24 | End: 2021-02-23

## 2021-01-24 RX ORDER — ACETAMINOPHEN 325 MG/1
325 TABLET ORAL EVERY 6 HOURS PRN
Qty: 60 TABLET | Refills: 0 | Status: SHIPPED | OUTPATIENT
Start: 2021-01-24 | End: 2021-03-11

## 2021-01-24 NOTE — LACTATION NOTE
This note was copied from a baby's chart. LACTATION NOTE - INFANT    Evaluation Type  Evaluation Type: Inpatient    Problems & Assessment  Problems Diagnosed or Identified: Sleepy; Shallow latch  Infant Assessment: Skin color: pink or appropriate for ethni

## 2021-01-24 NOTE — LACTATION NOTE
This note was copied from a baby's chart. LACTATION NOTE - INFANT    Evaluation Type  Evaluation Type: Inpatient    Problems & Assessment  Problems Diagnosed or Identified: Sleepy; Shallow latch  Problems: comment/detail: need to pull down the chin after a

## 2021-01-24 NOTE — LACTATION NOTE
LACTATION NOTE - MOTHER      Evaluation Type: Inpatient    Problems identified  Problems identified: Knowledge deficit; Nipple pain    Maternal history  Maternal history: AMA  Other/comment: uterine fibroid, vag bleeding    Breastfeeding goal  Breastfeeding

## 2021-01-24 NOTE — PROGRESS NOTES
[address]  Obstetrics and Gynecology    OB/GYN: Postpartum Progress Note     SUBJECTIVE:  Patient is a 38year old  female who is s/p . She is PPD# 2. Doing well. Noted doing well. Has some swelling in her legs still.  Imp

## 2021-01-24 NOTE — DISCHARGE SUMMARY
[facility]    Discharge Summary    Evorn Baumgarten Patient Status:  Inpatient    1980 MRN [id number]   Location [facility] 3SE Attending Roseann Echols MD   [facility] Day # 2 PCP Marni Muir MD     Date of Admission: 2021 Services. Specialty: GE PEDIATRICS  Why: As needed  Contact information:  [place] [address]  [phone]           Cheri Riggs MD In 6 weeks.     Specialty: OBSTETRICS & GYNECOLOGY  Why: Routine postpartum vi and help to prevent infection  · Vaginal/perineum Care: [address] for 15mins, 2-3 times a day,    Bathing/Showers  · You may resume showers  · No baths, swimming, hot tubs until your post-partum visit    Home Medication  · Resume your home medications as i anesthesiologists try very hard to make sure you receive the best care possible. Your trust in them as well as us is greatly appreciated.     Thanks so much,   [facility] Obstetrics and Gynecology             Dr. Jada Marcum MD    [facility] OBGYN

## 2021-01-25 LAB — T PALLIDUM AB SER QL: NEGATIVE

## 2021-01-29 ENCOUNTER — TELEPHONE (OUTPATIENT)
Dept: OBGYN UNIT | Facility: HOSPITAL | Age: 41
End: 2021-01-29

## 2021-03-11 ENCOUNTER — POSTPARTUM (OUTPATIENT)
Dept: OBGYN CLINIC | Facility: CLINIC | Age: 41
End: 2021-03-11
Payer: COMMERCIAL

## 2021-03-11 VITALS
HEIGHT: 62 IN | DIASTOLIC BLOOD PRESSURE: 72 MMHG | WEIGHT: 132 LBS | SYSTOLIC BLOOD PRESSURE: 114 MMHG | BODY MASS INDEX: 24.29 KG/M2

## 2021-03-11 DIAGNOSIS — Z12.31 ENCOUNTER FOR SCREENING MAMMOGRAM FOR MALIGNANT NEOPLASM OF BREAST: ICD-10-CM

## 2021-03-11 DIAGNOSIS — M25.559 HIP PAIN: ICD-10-CM

## 2021-03-11 DIAGNOSIS — Z30.09 FAMILY PLANNING: ICD-10-CM

## 2021-03-11 PROBLEM — Z87.59 H/O RAPID LABOR: Chronic | Status: RESOLVED | Noted: 2020-06-29 | Resolved: 2021-03-11

## 2021-03-11 PROBLEM — O46.93 VAGINAL BLEEDING IN PREGNANCY, THIRD TRIMESTER: Chronic | Status: RESOLVED | Noted: 2020-12-24 | Resolved: 2021-03-11

## 2021-03-11 PROCEDURE — 3074F SYST BP LT 130 MM HG: CPT | Performed by: OBSTETRICS & GYNECOLOGY

## 2021-03-11 PROCEDURE — 3078F DIAST BP <80 MM HG: CPT | Performed by: OBSTETRICS & GYNECOLOGY

## 2021-03-11 PROCEDURE — 3008F BODY MASS INDEX DOCD: CPT | Performed by: OBSTETRICS & GYNECOLOGY

## 2021-03-11 NOTE — PROGRESS NOTES
CC: Patient is here for 6 W FU after     HPI: Patient is a 38year old  for 6 W PP visit. Feels okay. No depression or anxiety. Breast feeding. She would like to return to work note. She is interested in having PT.  She feels like her lef the Last Year:   Transportation Needs:       Lack of Transportation (Medical):       Lack of Transportation (Non-Medical):   Physical Activity:       Days of Exercise per Week:       Minutes of Exercise per Session:   Stress:       Feeling of Stress :    So lactation.       Nico Gay MD

## 2022-02-14 ENCOUNTER — APPOINTMENT (OUTPATIENT)
Dept: GENERAL RADIOLOGY | Facility: HOSPITAL | Age: 42
End: 2022-02-14
Attending: EMERGENCY MEDICINE
Payer: COMMERCIAL

## 2022-02-14 ENCOUNTER — HOSPITAL ENCOUNTER (EMERGENCY)
Facility: HOSPITAL | Age: 42
Discharge: HOME | End: 2022-02-14
Attending: EMERGENCY MEDICINE
Payer: COMMERCIAL

## 2022-02-14 VITALS
BODY MASS INDEX: 23 KG/M2 | TEMPERATURE: 98 F | OXYGEN SATURATION: 99 % | DIASTOLIC BLOOD PRESSURE: 87 MMHG | HEIGHT: 62 IN | HEART RATE: 75 BPM | SYSTOLIC BLOOD PRESSURE: 125 MMHG | WEIGHT: 125 LBS | RESPIRATION RATE: 18 BRPM

## 2022-02-14 DIAGNOSIS — U07.1 COVID-19 VIRUS INFECTION: Primary | ICD-10-CM

## 2022-02-14 LAB
ALBUMIN SERPL-MCNC: 4 G/DL (ref 3.4–5)
ALBUMIN/GLOB SERPL: 1 {RATIO} (ref 1–2)
ALP LIVER SERPL-CCNC: 114 U/L
ALT SERPL-CCNC: 22 U/L
ANION GAP SERPL CALC-SCNC: 5 MMOL/L (ref 0–18)
AST SERPL-CCNC: 16 U/L (ref 15–37)
BASOPHILS # BLD AUTO: 0.03 X10(3) UL (ref 0–0.2)
BASOPHILS NFR BLD AUTO: 0.4 %
BILIRUB SERPL-MCNC: 0.3 MG/DL (ref 0.1–2)
BUN BLD-MCNC: 17 MG/DL (ref 7–18)
BUN/CREAT SERPL: 23.6 (ref 10–20)
CALCIUM BLD-MCNC: 9.3 MG/DL (ref 8.5–10.1)
CHLORIDE SERPL-SCNC: 104 MMOL/L (ref 98–112)
CO2 SERPL-SCNC: 29 MMOL/L (ref 21–32)
CREAT BLD-MCNC: 0.72 MG/DL
D DIMER PPP FEU-MCNC: <0.27 UG/ML FEU (ref ?–0.5)
DEPRECATED RDW RBC AUTO: 38.9 FL (ref 35.1–46.3)
EOSINOPHIL # BLD AUTO: 0.47 X10(3) UL (ref 0–0.7)
EOSINOPHIL NFR BLD AUTO: 7 %
ERYTHROCYTE [DISTWIDTH] IN BLOOD BY AUTOMATED COUNT: 12.6 % (ref 11–15)
GLOBULIN PLAS-MCNC: 4.1 G/DL (ref 2.8–4.4)
GLUCOSE BLD-MCNC: 86 MG/DL (ref 70–99)
HCT VFR BLD AUTO: 43 %
HGB BLD-MCNC: 13.6 G/DL
IMM GRANULOCYTES # BLD AUTO: 0.01 X10(3) UL (ref 0–1)
IMM GRANULOCYTES NFR BLD: 0.1 %
LYMPHOCYTES # BLD AUTO: 2.23 X10(3) UL (ref 1–4)
LYMPHOCYTES NFR BLD AUTO: 33.3 %
MCH RBC QN AUTO: 26.7 PG (ref 26–34)
MCHC RBC AUTO-ENTMCNC: 31.6 G/DL (ref 31–37)
MCV RBC AUTO: 84.3 FL
MONOCYTES # BLD AUTO: 0.54 X10(3) UL (ref 0.1–1)
MONOCYTES NFR BLD AUTO: 8.1 %
NEUTROPHILS # BLD AUTO: 3.41 X10 (3) UL (ref 1.5–7.7)
NEUTROPHILS # BLD AUTO: 3.41 X10(3) UL (ref 1.5–7.7)
NEUTROPHILS NFR BLD AUTO: 51.1 %
OSMOLALITY SERPL CALC.SUM OF ELEC: 287 MOSM/KG (ref 275–295)
PLATELET # BLD AUTO: 304 10(3)UL (ref 150–450)
POTASSIUM SERPL-SCNC: 3.9 MMOL/L (ref 3.5–5.1)
PROT SERPL-MCNC: 8.1 G/DL (ref 6.4–8.2)
RBC # BLD AUTO: 5.1 X10(6)UL
SODIUM SERPL-SCNC: 138 MMOL/L (ref 136–145)
TROPONIN I HIGH SENSITIVITY: <3 NG/L
WBC # BLD AUTO: 6.7 X10(3) UL (ref 4–11)

## 2022-02-14 PROCEDURE — 85379 FIBRIN DEGRADATION QUANT: CPT | Performed by: EMERGENCY MEDICINE

## 2022-02-14 PROCEDURE — 80053 COMPREHEN METABOLIC PANEL: CPT | Performed by: EMERGENCY MEDICINE

## 2022-02-14 PROCEDURE — 71045 X-RAY EXAM CHEST 1 VIEW: CPT | Performed by: EMERGENCY MEDICINE

## 2022-02-14 PROCEDURE — 93005 ELECTROCARDIOGRAM TRACING: CPT

## 2022-02-14 PROCEDURE — 85025 COMPLETE CBC W/AUTO DIFF WBC: CPT | Performed by: EMERGENCY MEDICINE

## 2022-02-14 PROCEDURE — 36415 COLL VENOUS BLD VENIPUNCTURE: CPT

## 2022-02-14 PROCEDURE — 93010 ELECTROCARDIOGRAM REPORT: CPT | Performed by: EMERGENCY MEDICINE

## 2022-02-14 PROCEDURE — 99284 EMERGENCY DEPT VISIT MOD MDM: CPT

## 2022-02-14 PROCEDURE — 84484 ASSAY OF TROPONIN QUANT: CPT | Performed by: EMERGENCY MEDICINE

## 2023-12-21 NOTE — TELEPHONE ENCOUNTER
Called pt back. She has had intermittent spotting recently. She was seen in L&D yesterday and had NST, normal USN, bpp. No internal exam performed. She did not work last night and she is still having scant bleeding which she notices only when wiping.  She h Informed of results/instructions. Verbalized understanding and agreed to plan. Rx to [facility]. Referral in epic. Pt aware Cardiology dept will contact her to schedule.

## (undated) NOTE — LETTER
Dear new mom:    We've missed you! The nurses of [facility] have tried to reach you by phone to ask if you had any questions regarding your health or the care of your new little one.     Please feel free to call your doctor with

## (undated) NOTE — LETTER
[address]      Authorization for Surgical Operation and Procedure     Date:_01/22/2021__________                                                                                                         Ti 4.   Should the need arise during my operation or immediate post-operative period, I also consent to the administration of blood and/or blood products.   Further, I understand that despite careful testing and screening of blood or blood products by jin 8.   I recognize that in the event my procedure results in extended X-Ray/fluoroscopy time, I may develop a skin reaction. 9.  If I have a Do Not Attempt Resuscitation (DNAR) order in place, that status will be suspended while in the operating room, proc STATEMENT OF PHYSICIAN My signature below affirms that prior to the time of the procedure; I have explained to the patient and/or his/her legal representative, the risks and benefits involved in the proposed treatment and any reasonable alternative to the

## (undated) NOTE — LETTER
[facility]  Administration of Anesthesia  1. Chavez Baugh, or _________________________________ acting on her behalf, (Patient) (Dependent/Representative) request to receive anesthesia for my pending procedure/operation/treatment. 6. OBSTETRIC PATIENTS: Specific risks/consequences of spinal/epidural anesthesia may include itching, low blood pressure, difficulty urinating, slowing of the baby's heart rate and headache.  Rare risks include infections, high spinal block, spinal bleeding ___________________________________________________           _____________________________________________________  Date/Time                                                                                               Responsible person in case of minor

## (undated) NOTE — LETTER
20    RE: Chase Gutiérrez    : 1980    To Whom It May Concern:    Our patient, Chase Sat,      _____Has received treatment in our office on ___________________________.        _____Has been hospitalized on the following dates ______

## (undated) NOTE — LETTER
21    RE: Gerald Wong    : 1980    To Whom It May Concern:    Our patient, Gerald Wong,      _____Has received treatment in our office on ___________________________.        _____Has been hospitalized on the following dates ______